# Patient Record
Sex: MALE | Race: BLACK OR AFRICAN AMERICAN | Employment: OTHER | URBAN - METROPOLITAN AREA
[De-identification: names, ages, dates, MRNs, and addresses within clinical notes are randomized per-mention and may not be internally consistent; named-entity substitution may affect disease eponyms.]

---

## 2021-11-05 ENCOUNTER — HOSPITAL ENCOUNTER (EMERGENCY)
Age: 51
Discharge: HOME | End: 2021-11-05
Attending: EMERGENCY MEDICINE
Payer: COMMERCIAL

## 2021-11-05 VITALS
SYSTOLIC BLOOD PRESSURE: 139 MMHG | OXYGEN SATURATION: 100 % | HEART RATE: 103 BPM | RESPIRATION RATE: 18 BRPM | BODY MASS INDEX: 32.14 KG/M2 | WEIGHT: 217 LBS | TEMPERATURE: 97.1 F | HEIGHT: 69 IN | DIASTOLIC BLOOD PRESSURE: 92 MMHG

## 2021-11-05 DIAGNOSIS — R51.9 FACIAL PAIN: Primary | ICD-10-CM

## 2021-11-05 PROCEDURE — 99282 EMERGENCY DEPT VISIT SF MDM: CPT

## 2021-11-05 RX ORDER — PENICILLIN V POTASSIUM 500 MG/1
500 TABLET, FILM COATED ORAL 4 TIMES DAILY
Qty: 28 TABLET | Refills: 0 | Status: SHIPPED | OUTPATIENT
Start: 2021-11-05 | End: 2021-11-12

## 2021-11-05 RX ORDER — HYDROCODONE BITARTRATE AND ACETAMINOPHEN 5; 325 MG/1; MG/1
1 TABLET ORAL
Qty: 10 TABLET | Refills: 0 | Status: SHIPPED | OUTPATIENT
Start: 2021-11-05 | End: 2021-11-10

## 2021-11-05 NOTE — ED PROVIDER NOTES
EMERGENCY DEPARTMENT HISTORY AND PHYSICAL EXAM    Date: 11/5/2021  Patient Name: Bryan Camejo    History of Presenting Illness     Chief Complaint   Patient presents with    Facial Swelling       History Provided By: Patient    Chief Complaint: facial pain     Additional History (Context):   4:31 PM  Bryan Camejo is a 46 y.o. male with PMHX HTN, diabetes presents to the emergency department C/O right-sided facial pain and swelling for the past couple days. Patient had tooth extraction about a week ago. He noticed that he was having increasing pain on the right side of his face with some swelling and then today noticed he was having some difficulty swallowing. No difficulty breathing. Patient is not on any antibiotics. He was seen by his dentist today who instructed him to come to the ED for evaluation. No fevers. He does have a history of diabetes but states his blood sugar usually runs between 80 and 200. PCP: Dayne Willis MD        Past History     Past Medical History:  No past medical history on file. Past Surgical History:  No past surgical history on file. Family History:  No family history on file. Social History:  Social History     Tobacco Use    Smoking status: Not on file    Smokeless tobacco: Not on file   Substance Use Topics    Alcohol use: Not on file    Drug use: Not on file       Allergies:  No Known Allergies    Review of Systems   Review of Systems   Constitutional: Negative for chills and fever. HENT: Positive for dental problem, facial swelling and trouble swallowing. Negative for ear discharge, ear pain, mouth sores, rhinorrhea, sinus pressure, sinus pain, sneezing and sore throat. Respiratory: Negative for shortness of breath. Cardiovascular: Negative for chest pain. Gastrointestinal: Negative for abdominal pain. Neurological: Negative for weakness and numbness. All other systems reviewed and are negative.       Physical Exam     Vitals: 11/05/21 1622   BP: (!) 139/92   Pulse: (!) 103   Resp: 18   Temp: 97.1 °F (36.2 °C)   SpO2: 100%   Weight: 98.4 kg (217 lb)   Height: 5' 9\" (1.753 m)     Physical Exam  Vitals and nursing note reviewed. Constitutional:       General: He is not in acute distress. Appearance: He is well-developed. He is not diaphoretic. Comments: Alert, non toxic, appears slightly uncomfortable   HENT:      Head: Normocephalic and atraumatic. Comments: Obvious swelling to the right side of the face tenderness to palpation over the right cheek, tenderness to the gingiva of the right bottom jaw with some erythema, edentulous in that area, able to open and close mouth completely no sublingual tenderness or induration, swallowing secretions without difficulty     Right Ear: Tympanic membrane, ear canal and external ear normal.      Left Ear: Tympanic membrane, ear canal and external ear normal.      Nose: Nose normal.      Mouth/Throat:      Mouth: Mucous membranes are not dry. Dentition: Abnormal dentition. Gingival swelling present. Pharynx: Uvula midline. No oropharyngeal exudate or posterior oropharyngeal erythema. Tonsils: No tonsillar abscesses. Cardiovascular:      Rate and Rhythm: Normal rate and regular rhythm. Heart sounds: Normal heart sounds. Pulmonary:      Effort: Pulmonary effort is normal. No respiratory distress. Breath sounds: Normal breath sounds. No stridor. No wheezing or rales. Musculoskeletal:      Cervical back: Normal range of motion and neck supple. Lymphadenopathy:      Cervical: Cervical adenopathy (right ) present. Skin:     General: Skin is warm and dry. Neurological:      Mental Status: He is alert and oriented to person, place, and time. Psychiatric:         Judgment: Judgment normal.         Diagnostic Study Results     Labs:   No results found for this or any previous visit (from the past 12 hour(s)).     Radiologic Studies:   No orders to display CT Results  (Last 48 hours)    None        CXR Results  (Last 48 hours)    None          Medical Decision Making   I am the first provider for this patient. I reviewed the vital signs, available nursing notes, past medical history, past surgical history, family history and social history. Vital Signs: Reviewed the patient's vital signs. Pulse Oximetry Analysis: 100% on RA     Records Reviewed: Nursing Notes and Old Medical Records    Procedures:  Procedures    ED Course:   4:31 PM Initial assessment performed. The patients presenting problems have been discussed, and they are in agreement with the care plan formulated and outlined with them. I have encouraged them to ask questions as they arise throughout their visit. Discussion:  Pt presents with right-sided facial swelling and tenderness to the gingiva for the past 4 days following dental procedure. No evidence of Daniella's angina or deeper infection. He is a diabetic but is generally well controlled. Patient complaining occultly swallowing but is able to swallow secretions without difficulty. Will cover with penicillin and have patient return for any new or worsening symptoms and follow-up with his dentist strict return precautions given, pt offering no questions or complaints. Diagnosis and Disposition     DISCHARGE NOTE:    Omayra Doherty  results have been reviewed with him. He has been counseled regarding his diagnosis, treatment, and plan. He verbally conveys understanding and agreement of the signs, symptoms, diagnosis, treatment and prognosis and additionally agrees to follow up as discussed. He also agrees with the care-plan and conveys that all of his questions have been answered.   I have also provided discharge instructions for him that include: educational information regarding their diagnosis and treatment, and list of reasons why they would want to return to the ED prior to their follow-up appointment, should his condition change. He has been provided with education for proper emergency department utilization. CLINICAL IMPRESSION:    1. Facial pain        PLAN:  1. D/C Home  2. Current Discharge Medication List      START taking these medications    Details   penicillin v potassium (VEETID) 500 mg tablet Take 1 Tablet by mouth four (4) times daily for 7 days. Qty: 28 Tablet, Refills: 0  Start date: 11/5/2021, End date: 11/12/2021      HYDROcodone-acetaminophen (Norco) 5-325 mg per tablet Take 1 Tablet by mouth every six (6) hours as needed for Pain for up to 5 days. Max Daily Amount: 4 Tablets. Qty: 10 Tablet, Refills: 0  Start date: 11/5/2021, End date: 11/10/2021    Associated Diagnoses: Facial pain           3. Follow-up Information     Follow up With Specialties Details Why Contact Info    your dentist        [facility] EMERGENCY DEPT Emergency Medicine  If symptoms worsen [address] [facility] [id number] [phone]             Please note that this dictation was completed with [employer] voice recognition software. Quite often unanticipated grammatical, syntax, homophones, and other interpretive errors are inadvertently transcribed by the computer software. Please disregard these errors. Please excuse any errors that have escaped final proofreading.

## 2021-11-05 NOTE — ED TRIAGE NOTES
Pt arrives ambulatory to ED with c\o RIGHT sided facial swelling s\p dental extractions, pt sts he was seen at dentist yesterday and instructed to come to ED for eval